# Patient Record
Sex: MALE | Race: WHITE | HISPANIC OR LATINO | URBAN - METROPOLITAN AREA
[De-identification: names, ages, dates, MRNs, and addresses within clinical notes are randomized per-mention and may not be internally consistent; named-entity substitution may affect disease eponyms.]

---

## 2018-05-12 ENCOUNTER — INPATIENT (INPATIENT)
Facility: HOSPITAL | Age: 73
LOS: 1 days | Discharge: HOME | DRG: 149 | End: 2018-05-14
Attending: INTERNAL MEDICINE | Admitting: INTERNAL MEDICINE
Payer: MEDICARE

## 2018-05-12 VITALS
TEMPERATURE: 97 F | SYSTOLIC BLOOD PRESSURE: 208 MMHG | HEART RATE: 67 BPM | RESPIRATION RATE: 18 BRPM | OXYGEN SATURATION: 99 % | WEIGHT: 143.96 LBS | HEIGHT: 65 IN | DIASTOLIC BLOOD PRESSURE: 81 MMHG

## 2018-05-12 DIAGNOSIS — R74.8 ABNORMAL LEVELS OF OTHER SERUM ENZYMES: ICD-10-CM

## 2018-05-12 DIAGNOSIS — I10 ESSENTIAL (PRIMARY) HYPERTENSION: ICD-10-CM

## 2018-05-12 DIAGNOSIS — I21.4 NON-ST ELEVATION (NSTEMI) MYOCARDIAL INFARCTION: ICD-10-CM

## 2018-05-12 DIAGNOSIS — E11.9 TYPE 2 DIABETES MELLITUS WITHOUT COMPLICATIONS: ICD-10-CM

## 2018-05-12 DIAGNOSIS — E78.00 PURE HYPERCHOLESTEROLEMIA, UNSPECIFIED: ICD-10-CM

## 2018-05-12 DIAGNOSIS — R42 DIZZINESS AND GIDDINESS: ICD-10-CM

## 2018-05-12 DIAGNOSIS — Z29.9 ENCOUNTER FOR PROPHYLACTIC MEASURES, UNSPECIFIED: ICD-10-CM

## 2018-05-12 LAB
ALBUMIN SERPL ELPH-MCNC: 3.4 G/DL — LOW (ref 3.5–5)
ALP SERPL-CCNC: 71 U/L — SIGNIFICANT CHANGE UP (ref 40–120)
ALT FLD-CCNC: 16 U/L DA — SIGNIFICANT CHANGE UP (ref 10–60)
ANION GAP SERPL CALC-SCNC: 6 MMOL/L — SIGNIFICANT CHANGE UP (ref 5–17)
ANION GAP SERPL CALC-SCNC: 7 MMOL/L — SIGNIFICANT CHANGE UP (ref 5–17)
APTT BLD: 27.1 SEC — LOW (ref 27.5–37.4)
AST SERPL-CCNC: 17 U/L — SIGNIFICANT CHANGE UP (ref 10–40)
BILIRUB SERPL-MCNC: 0.2 MG/DL — SIGNIFICANT CHANGE UP (ref 0.2–1.2)
BUN SERPL-MCNC: 15 MG/DL — SIGNIFICANT CHANGE UP (ref 7–18)
BUN SERPL-MCNC: 16 MG/DL — SIGNIFICANT CHANGE UP (ref 7–18)
CALCIUM SERPL-MCNC: 8.6 MG/DL — SIGNIFICANT CHANGE UP (ref 8.4–10.5)
CALCIUM SERPL-MCNC: 8.7 MG/DL — SIGNIFICANT CHANGE UP (ref 8.4–10.5)
CHLORIDE SERPL-SCNC: 106 MMOL/L — SIGNIFICANT CHANGE UP (ref 96–108)
CHLORIDE SERPL-SCNC: 107 MMOL/L — SIGNIFICANT CHANGE UP (ref 96–108)
CHOLEST SERPL-MCNC: 131 MG/DL — SIGNIFICANT CHANGE UP (ref 10–199)
CK MB BLD-MCNC: 1.9 % — SIGNIFICANT CHANGE UP (ref 0–3.5)
CK MB CFR SERPL CALC: 2.5 NG/ML — SIGNIFICANT CHANGE UP (ref 0–3.6)
CK SERPL-CCNC: 129 U/L — SIGNIFICANT CHANGE UP (ref 35–232)
CO2 SERPL-SCNC: 22 MMOL/L — SIGNIFICANT CHANGE UP (ref 22–31)
CO2 SERPL-SCNC: 23 MMOL/L — SIGNIFICANT CHANGE UP (ref 22–31)
CREAT SERPL-MCNC: 1.15 MG/DL — SIGNIFICANT CHANGE UP (ref 0.5–1.3)
CREAT SERPL-MCNC: 1.22 MG/DL — SIGNIFICANT CHANGE UP (ref 0.5–1.3)
EOSINOPHIL NFR BLD AUTO: 1 % — SIGNIFICANT CHANGE UP (ref 0–6)
GLUCOSE BLDC GLUCOMTR-MCNC: 124 MG/DL — HIGH (ref 70–99)
GLUCOSE BLDC GLUCOMTR-MCNC: 151 MG/DL — HIGH (ref 70–99)
GLUCOSE BLDC GLUCOMTR-MCNC: 154 MG/DL — HIGH (ref 70–99)
GLUCOSE BLDC GLUCOMTR-MCNC: 216 MG/DL — HIGH (ref 70–99)
GLUCOSE BLDC GLUCOMTR-MCNC: 222 MG/DL — HIGH (ref 70–99)
GLUCOSE SERPL-MCNC: 190 MG/DL — HIGH (ref 70–99)
GLUCOSE SERPL-MCNC: 194 MG/DL — HIGH (ref 70–99)
HCT VFR BLD CALC: 36.7 % — LOW (ref 39–50)
HCT VFR BLD CALC: 36.8 % — LOW (ref 39–50)
HDLC SERPL-MCNC: 32 MG/DL — LOW (ref 40–125)
HGB BLD-MCNC: 11.6 G/DL — LOW (ref 13–17)
HGB BLD-MCNC: 11.8 G/DL — LOW (ref 13–17)
INR BLD: 0.97 RATIO — SIGNIFICANT CHANGE UP (ref 0.88–1.16)
LACTATE SERPL-SCNC: 1.2 MMOL/L — SIGNIFICANT CHANGE UP (ref 0.7–2)
LIDOCAIN IGE QN: 163 U/L — SIGNIFICANT CHANGE UP (ref 73–393)
LIPID PNL WITH DIRECT LDL SERPL: 71 MG/DL — SIGNIFICANT CHANGE UP
LYMPHOCYTES # BLD AUTO: 31 % — SIGNIFICANT CHANGE UP (ref 13–44)
MAGNESIUM SERPL-MCNC: 2.2 MG/DL — SIGNIFICANT CHANGE UP (ref 1.6–2.6)
MCHC RBC-ENTMCNC: 26.3 PG — LOW (ref 27–34)
MCHC RBC-ENTMCNC: 26.7 PG — LOW (ref 27–34)
MCHC RBC-ENTMCNC: 31.5 GM/DL — LOW (ref 32–36)
MCHC RBC-ENTMCNC: 32 GM/DL — SIGNIFICANT CHANGE UP (ref 32–36)
MCV RBC AUTO: 83.4 FL — SIGNIFICANT CHANGE UP (ref 80–100)
MCV RBC AUTO: 83.5 FL — SIGNIFICANT CHANGE UP (ref 80–100)
MONOCYTES NFR BLD AUTO: 5 % — SIGNIFICANT CHANGE UP (ref 2–14)
NEUTROPHILS NFR BLD AUTO: 63 % — SIGNIFICANT CHANGE UP (ref 43–77)
PHOSPHATE SERPL-MCNC: 2.7 MG/DL — SIGNIFICANT CHANGE UP (ref 2.5–4.5)
PLATELET # BLD AUTO: 202 K/UL — SIGNIFICANT CHANGE UP (ref 150–400)
PLATELET # BLD AUTO: 231 K/UL — SIGNIFICANT CHANGE UP (ref 150–400)
POTASSIUM SERPL-MCNC: 3.6 MMOL/L — SIGNIFICANT CHANGE UP (ref 3.5–5.3)
POTASSIUM SERPL-MCNC: 4.6 MMOL/L — SIGNIFICANT CHANGE UP (ref 3.5–5.3)
POTASSIUM SERPL-SCNC: 3.6 MMOL/L — SIGNIFICANT CHANGE UP (ref 3.5–5.3)
POTASSIUM SERPL-SCNC: 4.6 MMOL/L — SIGNIFICANT CHANGE UP (ref 3.5–5.3)
PROT SERPL-MCNC: 8 G/DL — SIGNIFICANT CHANGE UP (ref 6–8.3)
PROTHROM AB SERPL-ACNC: 10.6 SEC — SIGNIFICANT CHANGE UP (ref 9.8–12.7)
RBC # BLD: 4.39 M/UL — SIGNIFICANT CHANGE UP (ref 4.2–5.8)
RBC # BLD: 4.41 M/UL — SIGNIFICANT CHANGE UP (ref 4.2–5.8)
RBC # FLD: 12.8 % — SIGNIFICANT CHANGE UP (ref 10.3–14.5)
RBC # FLD: 13.2 % — SIGNIFICANT CHANGE UP (ref 10.3–14.5)
SODIUM SERPL-SCNC: 135 MMOL/L — SIGNIFICANT CHANGE UP (ref 135–145)
SODIUM SERPL-SCNC: 136 MMOL/L — SIGNIFICANT CHANGE UP (ref 135–145)
TOTAL CHOLESTEROL/HDL RATIO MEASUREMENT: 4.1 RATIO — SIGNIFICANT CHANGE UP (ref 3.4–9.6)
TRIGL SERPL-MCNC: 139 MG/DL — SIGNIFICANT CHANGE UP (ref 10–149)
TROPONIN I SERPL-MCNC: 0.24 NG/ML — HIGH (ref 0–0.04)
TROPONIN I SERPL-MCNC: 0.25 NG/ML — HIGH (ref 0–0.04)
TROPONIN I SERPL-MCNC: 0.26 NG/ML — HIGH (ref 0–0.04)
TSH SERPL-MCNC: 0.78 UU/ML — SIGNIFICANT CHANGE UP (ref 0.34–4.82)
WBC # BLD: 4.6 K/UL — SIGNIFICANT CHANGE UP (ref 3.8–10.5)
WBC # BLD: 4.8 K/UL — SIGNIFICANT CHANGE UP (ref 3.8–10.5)
WBC # FLD AUTO: 4.6 K/UL — SIGNIFICANT CHANGE UP (ref 3.8–10.5)
WBC # FLD AUTO: 4.8 K/UL — SIGNIFICANT CHANGE UP (ref 3.8–10.5)

## 2018-05-12 PROCEDURE — 93010 ELECTROCARDIOGRAM REPORT: CPT | Mod: 76

## 2018-05-12 PROCEDURE — 70450 CT HEAD/BRAIN W/O DYE: CPT | Mod: 26

## 2018-05-12 PROCEDURE — 99285 EMERGENCY DEPT VISIT HI MDM: CPT | Mod: 25

## 2018-05-12 RX ORDER — ASPIRIN/CALCIUM CARB/MAGNESIUM 324 MG
81 TABLET ORAL DAILY
Qty: 0 | Refills: 0 | Status: DISCONTINUED | OUTPATIENT
Start: 2018-05-12 | End: 2018-05-14

## 2018-05-12 RX ORDER — ENOXAPARIN SODIUM 100 MG/ML
70 INJECTION SUBCUTANEOUS ONCE
Qty: 0 | Refills: 0 | Status: COMPLETED | OUTPATIENT
Start: 2018-05-12 | End: 2018-05-12

## 2018-05-12 RX ORDER — LISINOPRIL 2.5 MG/1
1 TABLET ORAL
Qty: 0 | Refills: 0 | COMMUNITY

## 2018-05-12 RX ORDER — LABETALOL HCL 100 MG
100 TABLET ORAL
Qty: 0 | Refills: 0 | Status: DISCONTINUED | OUTPATIENT
Start: 2018-05-12 | End: 2018-05-12

## 2018-05-12 RX ORDER — SODIUM CHLORIDE 9 MG/ML
3 INJECTION INTRAMUSCULAR; INTRAVENOUS; SUBCUTANEOUS ONCE
Qty: 0 | Refills: 0 | Status: COMPLETED | OUTPATIENT
Start: 2018-05-12 | End: 2018-05-12

## 2018-05-12 RX ORDER — INSULIN LISPRO 100/ML
VIAL (ML) SUBCUTANEOUS
Qty: 0 | Refills: 0 | Status: DISCONTINUED | OUTPATIENT
Start: 2018-05-12 | End: 2018-05-14

## 2018-05-12 RX ORDER — INSULIN GLARGINE 100 [IU]/ML
35 INJECTION, SOLUTION SUBCUTANEOUS AT BEDTIME
Qty: 0 | Refills: 0 | Status: DISCONTINUED | OUTPATIENT
Start: 2018-05-12 | End: 2018-05-14

## 2018-05-12 RX ORDER — ATORVASTATIN CALCIUM 80 MG/1
40 TABLET, FILM COATED ORAL AT BEDTIME
Qty: 0 | Refills: 0 | Status: DISCONTINUED | OUTPATIENT
Start: 2018-05-12 | End: 2018-05-14

## 2018-05-12 RX ORDER — ASPIRIN/CALCIUM CARB/MAGNESIUM 324 MG
325 TABLET ORAL ONCE
Qty: 0 | Refills: 0 | Status: DISCONTINUED | OUTPATIENT
Start: 2018-05-12 | End: 2018-05-12

## 2018-05-12 RX ADMIN — Medication 1.25 MILLIGRAM(S): at 11:01

## 2018-05-12 RX ADMIN — Medication 4: at 12:45

## 2018-05-12 RX ADMIN — INSULIN GLARGINE 35 UNIT(S): 100 INJECTION, SOLUTION SUBCUTANEOUS at 22:24

## 2018-05-12 RX ADMIN — ATORVASTATIN CALCIUM 40 MILLIGRAM(S): 80 TABLET, FILM COATED ORAL at 05:57

## 2018-05-12 RX ADMIN — ATORVASTATIN CALCIUM 40 MILLIGRAM(S): 80 TABLET, FILM COATED ORAL at 22:24

## 2018-05-12 RX ADMIN — SODIUM CHLORIDE 3 MILLILITER(S): 9 INJECTION INTRAMUSCULAR; INTRAVENOUS; SUBCUTANEOUS at 02:13

## 2018-05-12 RX ADMIN — Medication 4: at 17:52

## 2018-05-12 RX ADMIN — Medication 81 MILLIGRAM(S): at 13:37

## 2018-05-12 RX ADMIN — Medication 2: at 22:23

## 2018-05-12 RX ADMIN — ENOXAPARIN SODIUM 70 MILLIGRAM(S): 100 INJECTION SUBCUTANEOUS at 05:57

## 2018-05-12 NOTE — ED PROVIDER NOTE - PROGRESS NOTE DETAILS
repeat EKG w no changes in ST morphology compared to prior. Pt still asymptomatic. D/w MAR/Dr Patten, will admit for NSTEMI. Will repeat troponin

## 2018-05-12 NOTE — H&P ADULT - PROBLEM SELECTOR PLAN 4
on Lantus 40 and Humalog 10 ass needed  started on Lantus 35  HSS  f/u A1c pt is on acetazolamide, amlodipine, HCTZ,  and labetalol   restart slowly after permissive HTN ends

## 2018-05-12 NOTE — ED PROVIDER NOTE - CHPI ED SYMPTOMS POS
lightheadedness, abdominal distention/SHORTNESS OF BREATH/DIZZINESS lightheadedness, abdominal distention, tongue and head numbness/SHORTNESS OF BREATH/DIZZINESS

## 2018-05-12 NOTE — ED PROVIDER NOTE - MEDICAL DECISION MAKING DETAILS
73 y/o M pt with possible ACS but asymptomatic upon exam. EKG shows no ST elevation but possible hyperacute Ts in anterior leads V1-V3. No prior EKG to compare with and no reciprocal changes. Will give aspirin, Lovenox and admit for NSTEMI.

## 2018-05-12 NOTE — ED ADULT TRIAGE NOTE - CHIEF COMPLAINT QUOTE
ambulatory with c/o dizziness and stomach pain,no vomitting,while in triage he c/o numbness to the tongue since 5 pm today and also c/o headache

## 2018-05-12 NOTE — H&P ADULT - PROBLEM SELECTOR PLAN 1
was found to have troponin 0.256 with no chest pain   EKG: NSR @ 69 with questionable hyper acute T waves  Telemetry   ASA, statin   echo   Cardio: was found to have troponin 0.256 with no chest pain   EKG: NSR @ 69 with questionable hyper acute T waves  Telemetry   ASA, statin   echo   Cardio: Dr. Whitley

## 2018-05-12 NOTE — H&P ADULT - PROBLEM SELECTOR PLAN 2
accompanied with tongue and face  numbness   CT head negative   symptoms resolved while in ED  CD accompanied with tongue and face  numbness   CT head negative   symptoms resolved while in ED  orthostatic negative   Pt was at [facility] for dizziness but family does not know final Dx  CD accompanied with tongue and face  numbness   CT head negative   symptoms resolved while in ED  orthostatic negative   Pt was at [facility] for dizziness but family does not know final Dx  CD  Neuro: Dr Woody accompanied with tongue and face  numbness   CT head negative   symptoms resolved while in ED  orthostatic negative   Pt was at [facility] for dizziness but family does not know final Dx  Pt takes multiple BP medications, restart slowly after permissive HTN ends   CD  Neuro: Dr Woody accompanied with tongue and face  numbness   CT head negative   symptoms resolved while in ED  orthostatic negative   Pt was at [facility] for dizziness but family does not know final Dx  Pt takes multiple BP medications, restart slowly after permissive HTN ends   CD  Neuro: Dr Catalan accompanied with tongue and face  numbness   CT head negative   symptoms resolved while in ED  orthostatic negative   TIA?  Pt was at [facility] for dizziness but family does not know final Dx  Pt takes multiple BP medications, restart slowly after permissive HTN ends   CD  Neuro: Dr Catalan

## 2018-05-12 NOTE — H&P ADULT - ASSESSMENT
71 y/o M pt with PMHx of DM, HLD, CVA 2 years ago  presents to the ED  c/o dizziness, lightheadedness and face and tongue numbness. Pt states that symptoms started today at 5 pm accompanied with headache and SOB. At ED pt was found to have elevated troponin 0.256 and EKG was NSR @ 69 with concerns of hyper acute t waves. Pt denies CP, N/V, diarrhea or any other complaints. Admitted for ACS work up

## 2018-05-12 NOTE — H&P ADULT - HISTORY OF PRESENT ILLNESS
71 y/o M pt with PMHx of DM, HLD, CVA 2 years ago  presents to the ED  c/o dizziness, lightheadedness and face and tongue numbness. Pt states that symptoms started today at 5 pm accompanied with headache and SOB. As per wife SOB has been going on for a while now. Wife recommend to come to hospital but he decided to go to his daughter house. Symptoms persisted and then he came to hospital. Family states that pt was at [facility] due to dizziness but does not know the what the final diagnosis was. At ED pt was found to have elevated troponin 0.256 and EKG was NSR @ 69 with concerns of hyper acute t waves. Pt denies CP, N/V, diarrhea or any other complaints.

## 2018-05-12 NOTE — H&P ADULT - NSHPPHYSICALEXAM_GEN_ALL_CORE
PHYSICAL EXAM:  GENERAL: NAD, speaks in full sentences, no signs of respiratory distress  HEENT:  Atraumatic, Normocephalic,  EOMI, PERRLA, conjunctiva and sclera clear  NECK: Supple, No JVD  LUNG: Clear to auscultation bilaterally; No wheeze; No crackles; No accessory muscles used  CVS: Regular rate and rhythm, no RMG  ABDOMEN: Soft, Nontender, Nondistended; Bowel sounds present; No guarding  : no suprapubic pain   EXTREMITIES:  2+ Peripheral Pulses, No cyanosis or edema  SKIN: No rashes or lesions  Neuro: AAOx3, non-focal

## 2018-05-12 NOTE — ED PROVIDER NOTE - OBJECTIVE STATEMENT
73 y/o M pt with PMHx of DM, high cholesterol, MI and no significant PSHx presents to the ED with daughter as  c/o dizziness, lightheadedness, abdominal distention for 3x days and shortness of breath with an onset of today. Pt describes his lightheadedness as feeling like passing out every 15 minutes; notes that his tongue and head feels numb. Pt denies headache, chest pain or any other complaints. NKDA. 71 y/o M pt with PMHx of DM, high cholesterol, MI and no significant PSHx presents to the ED with daughter as  c/o dizziness, lightheadedness, abdominal distention for 3x days and shortness of breath with an onset of today. Pt describes his lightheadedness as feeling like passing out every 15 minutes; notes that his tongue and head feels numb. Pt denies headache, chest pain or any other complaints. NKDA. PMD: Dr. Giovany Green

## 2018-05-12 NOTE — H&P ADULT - PROBLEM SELECTOR PLAN 5
IMPROVE VTE Individual Risk Assessment    RISK                                                          Points  [] Previous VTE                                           3  [] Thrombophilia                                        2  [] Lower limb paralysis                              2   [] Current Cancer                                       2   [x] Immobilization > 24 hrs                        1  [] ICU/CCU stay > 24 hours                       1  [x] Age > 60                                                   1    IMPROVE VTE Score: 2 on Lantus 40 and Humalog 10 ass needed  started on Lantus 35  HSS  f/u A1c

## 2018-05-13 DIAGNOSIS — I63.9 CEREBRAL INFARCTION, UNSPECIFIED: ICD-10-CM

## 2018-05-13 DIAGNOSIS — R51 HEADACHE: ICD-10-CM

## 2018-05-13 LAB
CRP SERPL-MCNC: <0.2 MG/DL — SIGNIFICANT CHANGE UP (ref 0–0.4)
ERYTHROCYTE [SEDIMENTATION RATE] IN BLOOD: 34 MM/HR — HIGH (ref 0–20)
FOLATE SERPL-MCNC: 11.8 NG/ML — SIGNIFICANT CHANGE UP
GLUCOSE BLDC GLUCOMTR-MCNC: 192 MG/DL — HIGH (ref 70–99)
GLUCOSE BLDC GLUCOMTR-MCNC: 223 MG/DL — HIGH (ref 70–99)
GLUCOSE BLDC GLUCOMTR-MCNC: 226 MG/DL — HIGH (ref 70–99)
GLUCOSE BLDC GLUCOMTR-MCNC: 242 MG/DL — HIGH (ref 70–99)
GLUCOSE BLDC GLUCOMTR-MCNC: 78 MG/DL — SIGNIFICANT CHANGE UP (ref 70–99)
HBA1C BLD-MCNC: 8.8 % — HIGH (ref 4–5.6)
VIT B12 SERPL-MCNC: 680 PG/ML — SIGNIFICANT CHANGE UP (ref 232–1245)

## 2018-05-13 PROCEDURE — 99223 1ST HOSP IP/OBS HIGH 75: CPT

## 2018-05-13 RX ORDER — LABETALOL HCL 100 MG
100 TABLET ORAL
Qty: 0 | Refills: 0 | Status: DISCONTINUED | OUTPATIENT
Start: 2018-05-13 | End: 2018-05-14

## 2018-05-13 RX ADMIN — ATORVASTATIN CALCIUM 40 MILLIGRAM(S): 80 TABLET, FILM COATED ORAL at 21:34

## 2018-05-13 RX ADMIN — Medication 100 MILLIGRAM(S): at 17:56

## 2018-05-13 RX ADMIN — Medication 4: at 21:35

## 2018-05-13 RX ADMIN — Medication 4: at 17:56

## 2018-05-13 RX ADMIN — Medication 4: at 12:32

## 2018-05-13 RX ADMIN — Medication 1 DROP(S): at 21:35

## 2018-05-13 RX ADMIN — Medication 81 MILLIGRAM(S): at 12:32

## 2018-05-13 RX ADMIN — INSULIN GLARGINE 35 UNIT(S): 100 INJECTION, SOLUTION SUBCUTANEOUS at 21:35

## 2018-05-13 NOTE — CONSULT NOTE ADULT - ASSESSMENT
1) Right side headaches, dizziness- ? R/o Temporal arteritis, new stroke. Please order Sed rate, CRP. MRI-brain with and without, MRA-head without and MRA-neck with and without SHELBY. Continue aspirin for now.

## 2018-05-13 NOTE — CONSULT NOTE ADULT - SUBJECTIVE AND OBJECTIVE BOX
History of Present Illness:    HPI:  71 y/o M pt with PMHx of DM, HLD, CVA 2 years ago  presents to the ED  c/o dizziness, lightheadedness and face and tongue numbness. Pt states that symptoms started today at 5 pm accompanied with headache and SOB. As per wife SOB has been going on for a while now. Wife recommend to come to hospital but he decided to go to his daughter house. Symptoms persisted and then he came to hospital. Family states that pt was at [facility] due to dizziness but does not know the what the final diagnosis was. At ED pt was found to have elevated troponin 0.256 and EKG was NSR @ 69 with concerns of hyper acute t waves. Pt denies CP, N/V, diarrhea or any other complaints. (12 May 2018 05:32)    Later on the floor: Pt is c/o right sided headaches and blurred vision for last 2-3 days. He also felt dizziness. 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 No nausea or vomiting. His CT-scan of the brain shows old left PCA territory stroke.    Allergies    No Known Allergies    Intolerances      PAST MEDICAL & SURGICAL HISTORY:  High cholesterol  DM (diabetes mellitus)  No significant past surgical history    Social History: [ ] Tobacco use, [ ] Alcohol use.  none    MEDICATIONS  (STANDING):  aspirin enteric coated 81 milliGRAM(s) Oral daily  atorvastatin 40 milliGRAM(s) Oral at bedtime  insulin glargine Injectable (LANTUS) 35 Unit(s) SubCutaneous at bedtime  insulin lispro (HumaLOG) corrective regimen sliding scale   SubCutaneous Before meals and at bedtime  labetalol 100 milliGRAM(s) Oral two times a day    Family:  FAMILY HISTORY:  No pertinent family history in first degree relatives    Review of Systems:  General: [ ] None, [ ] chills,[ ] fatigue,[ ] fevers  Skin: [ ] None, [ ] rash present  HEENT: [ ] None, [ ] head injury,[x ] blurred vision, [ ] double vision, [ ] eye pain, [ ] visual loss, [ ] hearing loss, [ ] deafness, [ ] ear pain, [ ] ringing in the ears, [ ] vertigo, [ ] sinus pain, [ ] voice changes  Neck: [ ] None, [ ] Neck stiffness  Respiratory: [ ]  None, [ ] cough, [ ] difficulty breathing  Cardiovascular: [ ] None,  [ ] calf cramps, [ ] chest pain, [ ] leg pain, [ ] swelling, [ ] rapid heart rate, [ ] shortness of breath  Gastrointestinal: [ ] None, [ ] abdominal pain, [ ] nausea, [ ] vomiting  Musculoskeletal: [ ] None, [ ] back pain, [ ] joint pain, [ ] joint stiffness, [ ] leg cramps, [ ] muscle atrophy, [ ] muscle cramps, [ ] muscle weakness, [ ] swelling of extremities  Neurological: [ ] None,  [ ] Dizziness, [ ] decreased memory, [ ] fainting, [ ] focal neurological symptoms, [x ] headaches, [ ] incontinence of stool, [ ] incontinence of urine, [ ] loss of consciousness, [ ] numbness, [ ] seizures, [ ] spinning sensation, [ ] stroke, [ ] trouble walking, [ ] unsteadiness, [ ] visual changes,  [x ] weakness- left side, [ ] tremors, [ ] rigidity, [ ] slowness  Psychiatric: [ ] None,  [ ] depression, [ ] anxiety, [ ] hallucinations, [ ] inability to concentrate,[ ] mood changes, [ ] panic attacks  Hematology: [ ] None, [ ] blood clots, [ ] spontaneous bleeding    [x ]  None except marked above      Vital Signs:    T(C): 36.2 (05-13-18 @ 07:33), Max: 37.1 (05-12-18 @ 19:29)  HR: 77 (05-13-18 @ 07:33) (63 - 78)  BP: 162/69 (05-13-18 @ 07:33) (162/69 - 208/81)  RR: 18 (05-13-18 @ 07:33) (18 - 18)  SpO2: 100% (05-13-18 @ 07:33) (98% - 100%)    Physical Exam:  General:  General Appearance - Well groomed, Not sickly   Build and nutrition - Well nourished and Well developed  Posture - Normal posture    Head and Neck:  Head - normocephalic, atraumatic with no lesions or palpable masses    Chest and Lung exam:  Quiet, even and easy respiratory effort with no use of accessory muscles and on ausculation, normal breath sounds, no adventitious sounds and normal vocal resonance    Cardiovascular:  Auscultation: Normal heart sounds  Murmurs & Other heart sounds: Auscultation of the heart reveals- no murmurs  Carotid arteris: No Carotid bruits    Abdomen:  Palpation/Percussion: Non Tender, No Rebound tenderness, No hepatosplenomegaly, and No palpable abdominal masses    Peripheral Vascular:  Lower extremity: Inspection - Bilateral - No varicose veins  Palpation: Tenderness - bilateral - Non tender  Dorsalis pedis pulse - bilateral - normal  Edema - bilateral - no edema    Neurologic Exam:  Mental Status -  Alert, Awake  Fund of Knowledge – Normal  Affect- appropriate  Recent Memory – Normal  Remote Memory – Normal  Attention Span – Normal  Concentration –  Normal  Cognitive function – Normal  Speech – Normal  Thought content/perception – Normal    Cranial Nerves:  II Optic: Visual acuity – Bilateral - Normal ; Visual fields – normal ; Fundi – Bilateral – no optic atrophy or Papilledema  III Oculomotor – Normal bilaterally  IV Trochlear – Bilateral – Normal  V Trigeminal: Ophthalmic – Bilateral – Normal;  Maxillary – Bilateral- Normal; Mandibular – Bilateral - Normal  VI Abducens – Bilateral - Normal  VII Facial: mild left facial droop  VIII Acoustic - Bilateral – hearing normal and (hearing tested by finger rub)  IX Glossopharyngeal / X Vagus: Uvula – Normal  XI Accessory: Normal Shoulder Shrug  XII Hypoglossal – Bilaterally Normal  Eye Movements: Gaze – Bilateral - Normal    Nystagmus – Bilateral – None  Motor:  Bulk and Contour: Normal  Tone: Normal  Strength:                                                             Delt              Bicep           Tricep                                 Upper Extremities:          Right              5/5               5/5               5/5                5/5                                                          Left                4/5               4/5               4/5                5-/5                                                                                  HF                 KE                KF                   DF                     Lower Extremities:           Right             5/5               5/5              5/5                  5/5                                                          Left               4/5               4/5              4/5                  5-/5  General Assessment of Reflexes: Right Wrist - 2+ ;  Left Wrist - 2+ ; Right Elbow - 2+ ;  Left Elbow - 2+ ;  Right Knee - 2+ ;  Left Knee - 2+ ;   Right Ankle – 2+ ; Left Ankle – 2+  Plantar Reflexes(Babinski) (L4-S2) – Bilateral – Flexion  Sensory:  Light Touch: Intact – Globally  Pain: Intact – Globally  Temperature: Intact – Globally  Coordination – No Impairment of heel-to-shin, Impairment of finger-to-nose or Impairment of rapid alternating movement  Gait –mild left leg limp

## 2018-05-14 VITALS
TEMPERATURE: 98 F | OXYGEN SATURATION: 99 % | DIASTOLIC BLOOD PRESSURE: 73 MMHG | HEART RATE: 72 BPM | SYSTOLIC BLOOD PRESSURE: 152 MMHG | RESPIRATION RATE: 16 BRPM

## 2018-05-14 LAB
ANION GAP SERPL CALC-SCNC: 9 MMOL/L — SIGNIFICANT CHANGE UP (ref 5–17)
BUN SERPL-MCNC: 27 MG/DL — HIGH (ref 7–18)
CALCIUM SERPL-MCNC: 8.8 MG/DL — SIGNIFICANT CHANGE UP (ref 8.4–10.5)
CHLORIDE SERPL-SCNC: 107 MMOL/L — SIGNIFICANT CHANGE UP (ref 96–108)
CO2 SERPL-SCNC: 25 MMOL/L — SIGNIFICANT CHANGE UP (ref 22–31)
CREAT SERPL-MCNC: 1.2 MG/DL — SIGNIFICANT CHANGE UP (ref 0.5–1.3)
GLUCOSE BLDC GLUCOMTR-MCNC: 115 MG/DL — HIGH (ref 70–99)
GLUCOSE BLDC GLUCOMTR-MCNC: 167 MG/DL — HIGH (ref 70–99)
GLUCOSE SERPL-MCNC: 109 MG/DL — HIGH (ref 70–99)
HCT VFR BLD CALC: 36.3 % — LOW (ref 39–50)
HGB BLD-MCNC: 11.9 G/DL — LOW (ref 13–17)
MCHC RBC-ENTMCNC: 27 PG — SIGNIFICANT CHANGE UP (ref 27–34)
MCHC RBC-ENTMCNC: 32.8 GM/DL — SIGNIFICANT CHANGE UP (ref 32–36)
MCV RBC AUTO: 82.4 FL — SIGNIFICANT CHANGE UP (ref 80–100)
PLATELET # BLD AUTO: 212 K/UL — SIGNIFICANT CHANGE UP (ref 150–400)
POTASSIUM SERPL-MCNC: 3.2 MMOL/L — LOW (ref 3.5–5.3)
POTASSIUM SERPL-SCNC: 3.2 MMOL/L — LOW (ref 3.5–5.3)
RBC # BLD: 4.4 M/UL — SIGNIFICANT CHANGE UP (ref 4.2–5.8)
RBC # FLD: 12.8 % — SIGNIFICANT CHANGE UP (ref 10.3–14.5)
SODIUM SERPL-SCNC: 141 MMOL/L — SIGNIFICANT CHANGE UP (ref 135–145)
WBC # BLD: 5.3 K/UL — SIGNIFICANT CHANGE UP (ref 3.8–10.5)
WBC # FLD AUTO: 5.3 K/UL — SIGNIFICANT CHANGE UP (ref 3.8–10.5)

## 2018-05-14 PROCEDURE — 82607 VITAMIN B-12: CPT

## 2018-05-14 PROCEDURE — 82962 GLUCOSE BLOOD TEST: CPT

## 2018-05-14 PROCEDURE — 85730 THROMBOPLASTIN TIME PARTIAL: CPT

## 2018-05-14 PROCEDURE — 83036 HEMOGLOBIN GLYCOSYLATED A1C: CPT

## 2018-05-14 PROCEDURE — 93005 ELECTROCARDIOGRAM TRACING: CPT

## 2018-05-14 PROCEDURE — 70548 MR ANGIOGRAPHY NECK W/DYE: CPT

## 2018-05-14 PROCEDURE — 84443 ASSAY THYROID STIM HORMONE: CPT

## 2018-05-14 PROCEDURE — 80053 COMPREHEN METABOLIC PANEL: CPT

## 2018-05-14 PROCEDURE — 70544 MR ANGIOGRAPHY HEAD W/O DYE: CPT | Mod: 26,59

## 2018-05-14 PROCEDURE — 82550 ASSAY OF CK (CPK): CPT

## 2018-05-14 PROCEDURE — 70551 MRI BRAIN STEM W/O DYE: CPT | Mod: 26

## 2018-05-14 PROCEDURE — 83735 ASSAY OF MAGNESIUM: CPT

## 2018-05-14 PROCEDURE — 82553 CREATINE MB FRACTION: CPT

## 2018-05-14 PROCEDURE — 93306 TTE W/DOPPLER COMPLETE: CPT | Mod: 26

## 2018-05-14 PROCEDURE — 82746 ASSAY OF FOLIC ACID SERUM: CPT

## 2018-05-14 PROCEDURE — 70544 MR ANGIOGRAPHY HEAD W/O DYE: CPT

## 2018-05-14 PROCEDURE — 99285 EMERGENCY DEPT VISIT HI MDM: CPT | Mod: 25

## 2018-05-14 PROCEDURE — 85027 COMPLETE CBC AUTOMATED: CPT

## 2018-05-14 PROCEDURE — 83605 ASSAY OF LACTIC ACID: CPT

## 2018-05-14 PROCEDURE — 85652 RBC SED RATE AUTOMATED: CPT

## 2018-05-14 PROCEDURE — 70548 MR ANGIOGRAPHY NECK W/DYE: CPT | Mod: 26

## 2018-05-14 PROCEDURE — 84100 ASSAY OF PHOSPHORUS: CPT

## 2018-05-14 PROCEDURE — 85610 PROTHROMBIN TIME: CPT

## 2018-05-14 PROCEDURE — 80048 BASIC METABOLIC PNL TOTAL CA: CPT

## 2018-05-14 PROCEDURE — 83690 ASSAY OF LIPASE: CPT

## 2018-05-14 PROCEDURE — 70551 MRI BRAIN STEM W/O DYE: CPT

## 2018-05-14 PROCEDURE — 84484 ASSAY OF TROPONIN QUANT: CPT

## 2018-05-14 PROCEDURE — 70450 CT HEAD/BRAIN W/O DYE: CPT

## 2018-05-14 PROCEDURE — 93306 TTE W/DOPPLER COMPLETE: CPT

## 2018-05-14 PROCEDURE — 80061 LIPID PANEL: CPT

## 2018-05-14 PROCEDURE — 86140 C-REACTIVE PROTEIN: CPT

## 2018-05-14 RX ORDER — ASPIRIN/CALCIUM CARB/MAGNESIUM 324 MG
1 TABLET ORAL
Qty: 30 | Refills: 0 | OUTPATIENT
Start: 2018-05-14 | End: 2018-06-12

## 2018-05-14 RX ORDER — ATORVASTATIN CALCIUM 80 MG/1
1 TABLET, FILM COATED ORAL
Qty: 30 | Refills: 0 | OUTPATIENT
Start: 2018-05-14 | End: 2018-06-12

## 2018-05-14 RX ORDER — AMLODIPINE BESYLATE 2.5 MG/1
1 TABLET ORAL
Qty: 30 | Refills: 0 | OUTPATIENT
Start: 2018-05-14 | End: 2018-06-12

## 2018-05-14 RX ORDER — SITAGLIPTIN 50 MG/1
1 TABLET, FILM COATED ORAL
Qty: 30 | Refills: 0 | OUTPATIENT
Start: 2018-05-14 | End: 2018-06-12

## 2018-05-14 RX ORDER — METFORMIN HYDROCHLORIDE 850 MG/1
1 TABLET ORAL
Qty: 0 | Refills: 0 | COMMUNITY

## 2018-05-14 RX ORDER — ASPIRIN/CALCIUM CARB/MAGNESIUM 324 MG
1 TABLET ORAL
Qty: 0 | Refills: 0 | COMMUNITY

## 2018-05-14 RX ORDER — METFORMIN HYDROCHLORIDE 850 MG/1
1 TABLET ORAL
Qty: 60 | Refills: 0 | OUTPATIENT
Start: 2018-05-14 | End: 2018-06-12

## 2018-05-14 RX ORDER — LISINOPRIL 2.5 MG/1
1 TABLET ORAL
Qty: 30 | Refills: 0 | OUTPATIENT
Start: 2018-05-14 | End: 2018-06-12

## 2018-05-14 RX ORDER — ENOXAPARIN SODIUM 100 MG/ML
40 INJECTION SUBCUTANEOUS
Qty: 1 | Refills: 0 | OUTPATIENT
Start: 2018-05-14 | End: 2018-06-12

## 2018-05-14 RX ORDER — LABETALOL HCL 100 MG
1 TABLET ORAL
Qty: 0 | Refills: 0 | COMMUNITY

## 2018-05-14 RX ORDER — LABETALOL HCL 100 MG
1 TABLET ORAL
Qty: 60 | Refills: 0 | OUTPATIENT
Start: 2018-05-14 | End: 2018-06-12

## 2018-05-14 RX ORDER — LISINOPRIL 2.5 MG/1
1 TABLET ORAL
Qty: 0 | Refills: 0 | COMMUNITY

## 2018-05-14 RX ORDER — AMLODIPINE BESYLATE 2.5 MG/1
1 TABLET ORAL
Qty: 0 | Refills: 0 | COMMUNITY

## 2018-05-14 RX ORDER — ENOXAPARIN SODIUM 100 MG/ML
40 INJECTION SUBCUTANEOUS
Qty: 0 | Refills: 0 | COMMUNITY

## 2018-05-14 RX ORDER — POTASSIUM CHLORIDE 20 MEQ
40 PACKET (EA) ORAL EVERY 4 HOURS
Qty: 0 | Refills: 0 | Status: COMPLETED | OUTPATIENT
Start: 2018-05-14 | End: 2018-05-14

## 2018-05-14 RX ORDER — INSULIN LISPRO 100/ML
10 VIAL (ML) SUBCUTANEOUS
Qty: 0 | Refills: 0 | COMMUNITY

## 2018-05-14 RX ORDER — ACETAZOLAMIDE 250 MG/1
1 TABLET ORAL
Qty: 0 | Refills: 0 | COMMUNITY

## 2018-05-14 RX ORDER — AMLODIPINE BESYLATE 2.5 MG/1
10 TABLET ORAL DAILY
Qty: 0 | Refills: 0 | Status: DISCONTINUED | OUTPATIENT
Start: 2018-05-14 | End: 2018-05-14

## 2018-05-14 RX ORDER — HYDROCHLOROTHIAZIDE 25 MG
12.5 TABLET ORAL DAILY
Qty: 0 | Refills: 0 | Status: DISCONTINUED | OUTPATIENT
Start: 2018-05-14 | End: 2018-05-14

## 2018-05-14 RX ORDER — SITAGLIPTIN 50 MG/1
1 TABLET, FILM COATED ORAL
Qty: 0 | Refills: 0 | COMMUNITY

## 2018-05-14 RX ADMIN — Medication 100 MILLIGRAM(S): at 05:18

## 2018-05-14 RX ADMIN — Medication 100 MILLIGRAM(S): at 17:09

## 2018-05-14 RX ADMIN — Medication 40 MILLIEQUIVALENT(S): at 19:30

## 2018-05-14 RX ADMIN — Medication 40 MILLIEQUIVALENT(S): at 17:08

## 2018-05-14 RX ADMIN — Medication 81 MILLIGRAM(S): at 12:19

## 2018-05-14 RX ADMIN — Medication 2: at 17:08

## 2018-05-14 RX ADMIN — Medication 1 DROP(S): at 12:19

## 2018-05-14 NOTE — PROGRESS NOTE ADULT - ATTENDING COMMENTS
patient is clinically stable   if ok with cardiology and neurology afterTEE and MRI   then d/c later this afternoon
Thank you for the courtesy of the consultation,I would be available for any further discussion if needed.  Norm Whitley MD,FACC.  [address] [place], [place] [id number] [phone]
Thank you for the courtesy of the consultation,I would be available for any further discussion if needed.  Norm Whitley MD,FACC.  [address] [place], [place] [id number] [phone]
CONTINUE CURRENT CARE

## 2018-05-14 NOTE — DISCHARGE NOTE ADULT - HOSPITAL COURSE
71 y/o M pt with PMHx of DM, HLD, CVA 2 years ago  presents to the ED  c/o dizziness, lightheadedness and face and tongue numbness. Pt states that symptoms started today at 5 pm accompanied with headache and SOB. At ED pt was found to have elevated troponin 0.256 and EKG was NSR @ 69 with concerns of hyper acute t waves. Pt denies CP, N/V, diarrhea or any other complaints.     Admitted for ACS work up. Tele monitoring remained uneventful  Elevated trops likely 2/2 demand ischemia from HTN  CT head neg for acute process  MRI neg for acute stroke  MRA neck significant for RT sided ICA stenosis 25-30%  Echo- nml ventricular function w/ EF 55-60%    Neuro: Dr. Catalan  Cardio: Dr. Whitley    Pt's symptoms resolved during stay.  Pt currently medically stable for discharge with instructiion to schedule a close follow up with PCP within 1 week for follow up.

## 2018-05-14 NOTE — DISCHARGE NOTE ADULT - MEDICATION SUMMARY - MEDICATIONS TO STOP TAKING
I will STOP taking the medications listed below when I get home from the hospital:    acetaZOLAMIDE 250 mg oral tablet  -- 1 tab(s) by mouth once a day I will STOP taking the medications listed below when I get home from the hospital:    acetaZOLAMIDE 250 mg oral tablet  -- 1 tab(s) by mouth once a day    HumaLOG KwikPen 100 units/mL injectable solution  -- 10 unit(s) injectable once a day

## 2018-05-14 NOTE — PHYSICAL THERAPY INITIAL EVALUATION ADULT - RANGE OF MOTION EXAMINATION, REHAB EVAL
AA-AROM/Left LE ROM was WFL (within functional limits)/Left UE ROM was WFL (within functional limits)

## 2018-05-14 NOTE — DISCHARGE NOTE ADULT - PLAN OF CARE
Please continue current medication regimen, and schedule a close follow up with your PCP You presented with dizziness and were admitted for possible CVA.  CT head was neg for acute process.  MRI neg for stroke.  Carotid doppler significant for rt sided ICA stenosis of 25-30%.  Echo sig for normal ejection fraction w/ EF 55-60%.  Please continue current medication regimen, and schedule a close follow up with your PCP for follow up. Please continue current antihypertensive regimen You presented with hypertension, and BP meds were held for permissive hypertension.  Please resume BP regimen, and schedule a close follow up with your PCP. HbA1c: 8.8 You diabetes is poorly controlled.  Please continue current insulin regimen and follow up with your PCP as  outpatient within 1 week for follow up. Please continue statin therapy You diabetes is poorly controlled.  Please continue current insulin regimen and follow up with your PCP as  outpatient within 1 week for follow up.  Please hold 10 units pre meal for now and resume as indicated.  Please check glucose before meals.

## 2018-05-14 NOTE — DISCHARGE NOTE ADULT - MEDICATION SUMMARY - MEDICATIONS TO TAKE
I will START or STAY ON the medications listed below when I get home from the hospital:    Aspirin Enteric Coated 81 mg oral delayed release tablet  -- 1 tab(s) by mouth once a day  -- Indication: For Stroke    lisinopril 40 mg oral tablet  -- 1 tab(s) by mouth once a day  -- Indication: For HTN (hypertension)    metFORMIN 1000 mg oral tablet  -- 1 tab(s) by mouth 2 times a day  -- Indication: For DM (diabetes mellitus)    Januvia 50 mg oral tablet  -- 1 tab(s) by mouth once a day  -- Indication: For DM (diabetes mellitus)    Lantus Solostar Pen 100 units/mL subcutaneous solution  -- 40 unit(s) subcutaneous once a day (at bedtime)  -- Indication: For DM (diabetes mellitus)    HumaLOG KwikPen 100 units/mL injectable solution  -- 10 unit(s) injectable once a day  -- Indication: For DM (diabetes mellitus)    atorvastatin 40 mg oral tablet  -- 1 tab(s) by mouth once a day (at bedtime)  -- Indication: For High cholesterol    labetalol 100 mg oral tablet  -- 1 tab(s) by mouth 2 times a day  -- Indication: For HTN (hypertension)    amLODIPine 10 mg oral tablet  -- 1 tab(s) by mouth once a day  -- Indication: For HTN (hypertension)    hydroCHLOROthiazide 12.5 mg oral capsule  -- 1 cap(s) by mouth once a day  -- Indication: For HTN (hypertension) I will START or STAY ON the medications listed below when I get home from the hospital:    Aspirin Enteric Coated 81 mg oral delayed release tablet  -- 1 tab(s) by mouth once a day  -- Indication: For Stroke    lisinopril 40 mg oral tablet  -- 1 tab(s) by mouth once a day  -- Indication: For HTN (hypertension)    Januvia 50 mg oral tablet  -- 1 tab(s) by mouth once a day  -- Indication: For DM (diabetes mellitus)    Lantus Solostar Pen 100 units/mL subcutaneous solution  -- 40 unit(s) subcutaneous once a day (at bedtime)  -- Indication: For DM (diabetes mellitus)    metFORMIN 1000 mg oral tablet  -- 1 tab(s) by mouth 2 times a day  -- Indication: For DM (diabetes mellitus)    atorvastatin 40 mg oral tablet  -- 1 tab(s) by mouth once a day (at bedtime)  -- Indication: For High cholesterol    labetalol 100 mg oral tablet  -- 1 tab(s) by mouth 2 times a day  -- Indication: For HTN (hypertension)    amLODIPine 10 mg oral tablet  -- 1 tab(s) by mouth once a day  -- Indication: For HTN (hypertension)    hydroCHLOROthiazide 12.5 mg oral capsule  -- 1 cap(s) by mouth once a day  -- Indication: For HTN (hypertension) I will START or STAY ON the medications listed below when I get home from the hospital:    Aspirin Enteric Coated 81 mg oral delayed release tablet  -- 1 tab(s) by mouth once a day  -- Indication: For Need for prophylactic measure    lisinopril 40 mg oral tablet  -- 1 tab(s) by mouth once a day  -- Indication: For HTN (hypertension)    Lantus Solostar Pen 100 units/mL subcutaneous solution  -- 40 unit(s) subcutaneous once a day (at bedtime)  -- Indication: For DM (diabetes mellitus)    Januvia 50 mg oral tablet  -- 1 tab(s) by mouth once a day  -- Indication: For DM (diabetes mellitus)    metFORMIN 1000 mg oral tablet  -- 1 tab(s) by mouth 2 times a day  -- Indication: For DM (diabetes mellitus)    atorvastatin 40 mg oral tablet  -- 1 tab(s) by mouth once a day (at bedtime)  -- Indication: For HLD    labetalol 100 mg oral tablet  -- 1 tab(s) by mouth 2 times a day  -- Indication: For HTN (hypertension)    amLODIPine 10 mg oral tablet  -- 1 tab(s) by mouth once a day  -- Indication: For HTN (hypertension)    hydroCHLOROthiazide 12.5 mg oral capsule  -- 1 cap(s) by mouth once a day  -- Indication: For HTN (hypertension)

## 2018-05-14 NOTE — PROGRESS NOTE ADULT - ASSESSMENT
Problem/Plan - 1:  ·  Problem: Elevated troponin.  Plan: was found to have troponin 0.256 with no chest pain   EKG: NSR @ 69 with questionable hyper acute T waves  Telemetry   ASA, statin   echo   Cardio: Dr. Whitley.     Problem/Plan - 2:  ·  Problem: Dizziness.  Plan: accompanied with tongue and face  numbness   CT head negative   symptoms resolved while in ED  orthostatic negative   TIA?  Pt was at [facility] for dizziness but family does not know final Dx  Pt takes multiple BP medications, restart slowly after permissive HTN ends   CD  Neuro: Dr Catalan.     Problem/Plan - 3:  ·  Problem: High cholesterol.  Plan: on statins.     Problem/Plan - 4:  ·  Problem: HTN (hypertension).  Plan: pt is on acetazolamide, amlodipine, HCTZ,  and labetalol   restart slowly after permissive HTN ends.     Problem/Plan - 5:  ·  Problem: DM (diabetes mellitus).  Plan: on Lantus 40 and Humalog 10 ass needed  started on Lantus 35  HSS
Problem/Plan - 1:  ·  Problem: Elevated troponin.  Plan: was found to have troponin 0.256 with no chest pain   EKG: NSR @ 69 with questionable hyper acute T waves  Telemetry   ASA, statin   echo   Cardio: Dr. Whitley.     Problem/Plan - 2:  ·  Problem: Dizziness.  Plan: accompanied with tongue and face  numbness   CT head negative   symptoms resolved while in ED  orthostatic negative   TIA?  Pt was at [facility] for dizziness but family does not know final Dx  Pt takes multiple BP medications, restart slowly after permissive HTN ends   CD  Neuro: Dr Catalan.     Problem/Plan - 3:  ·  Problem: High cholesterol.  Plan: on statins.     Problem/Plan - 4:  ·  Problem: HTN (hypertension).  Plan: pt is on acetazolamide, amlodipine, HCTZ,  and labetalol   restart slowly after permissive HTN ends.     Problem/Plan - 5:  ·  Problem: DM (diabetes mellitus).  Plan: on Lantus 40 and Humalog 10 ass needed  started on Lantus 35  HSS  f/u A1c.     Problem/Plan - 6:  Problem: Need for prophylactic measure. Plan: IMPROVE VTE Individual Risk Assessment    RISK                                                          Points  [] Previous VTE                                           3  [] Thrombophilia                                        2  [] Lower limb paralysis                              2   [] Current Cancer                                       2

## 2018-05-14 NOTE — DISCHARGE NOTE ADULT - CARE PLAN
Principal Discharge DX:	Dizziness  Goal:	Please continue current medication regimen, and schedule a close follow up with your PCP  Assessment and plan of treatment:	You presented with dizziness and were admitted for possible CVA.  CT head was neg for acute process.  MRI neg for stroke.  Carotid doppler significant for rt sided ICA stenosis of 25-30%.  Echo sig for normal ejection fraction w/ EF 55-60%.  Please continue current medication regimen, and schedule a close follow up with your PCP for follow up.  Secondary Diagnosis:	HTN (hypertension)  Goal:	Please continue current antihypertensive regimen  Assessment and plan of treatment:	You presented with hypertension, and BP meds were held for permissive hypertension.  Please resume BP regimen, and schedule a close follow up with your PCP.  Secondary Diagnosis:	DM (diabetes mellitus)  Goal:	HbA1c: 8.8  Assessment and plan of treatment:	You diabetes is poorly controlled.  Please continue current insulin regimen and follow up with your PCP as  outpatient within 1 week for follow up.  Secondary Diagnosis:	High cholesterol  Goal:	Please continue statin therapy Principal Discharge DX:	Dizziness  Goal:	Please continue current medication regimen, and schedule a close follow up with your PCP  Assessment and plan of treatment:	You presented with dizziness and were admitted for possible CVA.  CT head was neg for acute process.  MRI neg for stroke.  Carotid doppler significant for rt sided ICA stenosis of 25-30%.  Echo sig for normal ejection fraction w/ EF 55-60%.  Please continue current medication regimen, and schedule a close follow up with your PCP for follow up.  Secondary Diagnosis:	HTN (hypertension)  Goal:	Please continue current antihypertensive regimen  Assessment and plan of treatment:	You presented with hypertension, and BP meds were held for permissive hypertension.  Please resume BP regimen, and schedule a close follow up with your PCP.  Secondary Diagnosis:	DM (diabetes mellitus)  Goal:	HbA1c: 8.8  Assessment and plan of treatment:	You diabetes is poorly controlled.  Please continue current insulin regimen and follow up with your PCP as  outpatient within 1 week for follow up.  Please hold 10 units pre meal for now and resume as indicated.  Please check glucose before meals.  Secondary Diagnosis:	High cholesterol  Goal:	Please continue statin therapy

## 2018-05-14 NOTE — DISCHARGE NOTE ADULT - PATIENT PORTAL LINK FT
You can access the [facility], offered by [facility], by registering with the following website: [email or web address]

## 2018-05-14 NOTE — PROGRESS NOTE ADULT - SUBJECTIVE AND OBJECTIVE BOX
PRESENTING CC:Dizziness    SUBJ:71 y/o M pt with PMHx of DM, HLD, CVA 2 years ago  presents to the ED  c/o dizziness, lightheadedness and face and tongue numbness.In ED pt was found to have elevated troponin 0.256 and EKG was NSR @ 69 with concerns of hyper acute T waves. Pt denies CP, N/V, diarrhea or any other complaints.No overnight events noted. BP remains elevated           PMH -reviewed admission note, no change since admission  Heart failure: acute [ ] chronic [ ] acute or chronic [ ] diastolic [ ] systolic [ ] combined systolic and diastolic[ ]  ANDREW: ATN[ ] renal medullary necrosis [ ] CKD I [ ]CKDII [ ]CKD III [ ]CKD IV [ ]CKD V [ ]Other pathological lesions [ ]    MEDICATIONS  (STANDING):  aspirin enteric coated 81 milliGRAM(s) Oral daily  atorvastatin 40 milliGRAM(s) Oral at bedtime  insulin glargine Injectable (LANTUS) 35 Unit(s) SubCutaneous at bedtime  insulin lispro (HumaLOG) corrective regimen sliding scale   SubCutaneous Before meals and at bedtime      FAMILY HISTORY:  No pertinent family history in first degree relatives  No family history of premature coronary artery disease or sudden cardiac death      REVIEW OF SYSTEMS:  Constitutional: [ ] fever, [ ]weight loss,  [x ]fatigue  Eyes: [ ] visual changes  Respiratory: [ ]shortness of breath;  [ ] cough, [ ]wheezing, [ ]chills, [ ]hemoptysis  Cardiovascular: [ ] chest pain, [ ]palpitations, [x ]dizziness,  [ ]leg swelling[ ]orthopnea[ ]PND  Gastrointestinal: [ ] abdominal pain, [ ]nausea, [ ]vomiting,  [ ]diarrhea   Genitourinary: [ ] dysuria, [ ] hematuria  Neurologic: [ ] headaches [ ] tremors[ ]weakness  Skin: [ ] itching, [ ]burning, [ ] rashes  Endocrine: [ ] heat or cold intolerance  Musculoskeletal: [ ] joint pain or swelling; [ ] muscle, back, or extremity pain  Psychiatric: [ ] depression, [ ]anxiety, [ ]mood swings, or [ ]difficulty sleeping  Hematologic: [ ] easy bruising, [ ] bleeding gums    [x] All remaining systems negative except as per above.   [ ]Unable to obtain.    Vital Signs Last 24 Hrs  T(C): 36.7 (13 May 2018 05:00), Max: 37.1 (12 May 2018 19:29)  T(F): 98 (13 May 2018 05:00), Max: 98.7 (12 May 2018 19:29)  HR: 70 (13 May 2018 05:00) (65 - 78)  BP: 179/78 (13 May 2018 05:00) (171/77 - 196/78)  RR: 18 (13 May 2018 05:00) (18 - 18)  SpO2: 99% (13 May 2018 05:00) (98% - 100%)  I&O's Summary      PHYSICAL EXAM:  General: No acute distress BMI-24  HEENT: EOMI, PERRL  Neck: Supple, [ ] JVD  Lungs: Equal air entry bilaterally; [ ] rales [ ] wheezing [ ] rhonchi  Heart: Regular rate and rhythm; [x ] murmur   2/6 [x] systolic [ ] diastolic [ ] radiation[ ] rubs [ ]  gallops  Abdomen: Nontender, bowel sounds present  Extremities: No clubbing, cyanosis, [ ] edema  Nervous system:  Alert & Oriented X3, no focal deficits  Psychiatric: Normal affect  Skin: No rashes or lesions    LABS:  05-12    136  |  107  |  15  ----------------------------<  194<H>  3.6   |  23  |  1.15    Ca    8.7      12 May 2018 11:07  Phos  2.7     05-12  Mg     2.2     05-12    TPro  8.0  /  Alb  3.4<L>  /  TBili  0.2  /  DBili  x   /  AST  17  /  ALT  16  /  AlkPhos  71  05-12    Creatinine Trend: 1.15<--, 1.22<--                        11.8   4.8   )-----------( 202      ( 12 May 2018 12:21 )             36.8     PT/INR - ( 12 May 2018 02:02 )   PT: 10.6 sec;   INR: 0.97 ratio         PTT - ( 12 May 2018 02:02 )  PTT:27.1 sec  Cardiac Enzymes: CARDIAC MARKERS ( 12 May 2018 11:07 )  0.245 ng/mL / x     / 129 U/L / x     / 2.5 ng/mL  CARDIAC MARKERS ( 12 May 2018 04:44 )  0.251 ng/mL / x     / x     / x     / x      CARDIAC MARKERS ( 12 May 2018 02:02 )  0.256 ng/mL / x     / x     / x     / x          RADIOLOGY: US DPLX CAROTIDS COMPL BI    IMPRESSION:  Moderate to significant mixed plaque at bilateral carotid bulbs extending  into the bilateral proximal internal and external carotid arteries.   Elevated velocities on the left suggesting 50-69% stenosis.   Although  velocities not measured elevated on the right given the amount of plaque and narrowing of the color flow there is concern for stenosis.      CT BRAIN     IMPRESSION: No evidence of acute intracranial hemorrhage, midline shift or CT  evidence of acute territorial infarct.      ECG [my interpretation]:Sinus rhythm at 69bpm,non specific T wave abnormalities.    TELEMETRY:Sinus rhythm no arrhythmias      IMPRESSION AND PLAN:  71 y/o M pt with PMHx of DM, HLD, CVA 2 years ago  presents to the ED  c/o dizziness, lightheadedness and face and tongue numbness.       Problem/Plan - 1:  ·  Problem: Elevated troponin.  Plan: was found to have troponin 0.256 with no chest pain   EKG: NSR @ 69 with questionable hyper acute T waves-  Troponins non diagnostic for cardiac injury-likely leak 2/2 Accelerated HTN.  TTE-LV Function      Problem/Plan - 2:  ·  Problem: Dizziness.  Plan: accompanied with tongue and face  numbness   CT head negative   symptoms resolved while in ED  orthostatic negative.  -Awaiting MRI.Carotid disease-r/o Right critical [id number]     Problem/Plan - 3:  ·  Problem: HTN (hypertension).  Plan: pt is on acetazolamide, amlodipine, HCTZ,  and labetalol   restart BP meds to keep SBP~~150      Problem/Plan - 4:  ·  Problem: DM (diabetes mellitus).  Plan: on Lantus 40 and Humalog 10 ass needed  started on Lantus 35
PRESENTING CC:Dizziness.    SUBJ: 73 y/o M pt with PMHx of DM, HLD, CVA 2 years ago  presents to the ED  c/o dizziness, lightheadedness and face and tongue numbness.In ED pt was found to have elevated troponin 0.256 and EKG was NSR @ 69 with concerns of hyper acute T waves. Pt denies CP, N/V, diarrhea or any other complaints.No overnight events noted.       PMH -reviewed admission note, no change since admission  Heart failure: acute [ ] chronic [ ] acute or chronic [ ] diastolic [ ] systolic [ ] combined systolic and diastolic[ ]  ANDREW: ATN[ ] renal medullary necrosis [ ] CKD I [ ]CKDII [ ]CKD III [ ]CKD IV [ ]CKD V [ ]Other pathological lesions [ ]    MEDICATIONS  (STANDING):  artificial  tears Solution 1 Drop(s) Both EYES daily  aspirin enteric coated 81 milliGRAM(s) Oral daily  atorvastatin 40 milliGRAM(s) Oral at bedtime  insulin glargine Injectable (LANTUS) 35 Unit(s) SubCutaneous at bedtime  insulin lispro (HumaLOG) corrective regimen sliding scale   SubCutaneous Before meals and at bedtime  labetalol 100 milliGRAM(s) Oral two times a day        FAMILY HISTORY:  No pertinent family history in first degree relatives  No family history of premature coronary artery disease or sudden cardiac death      REVIEW OF SYSTEMS:  Constitutional: [ ] fever, [ ]weight loss,  [x ]fatigue  Eyes: [ ] visual changes  Respiratory: [ ]shortness of breath;  [ ] cough, [ ]wheezing, [ ]chills, [ ]hemoptysis  Cardiovascular: [ ] chest pain, [ ]palpitations, [x ]dizziness,  [ ]leg swelling[ ]orthopnea[ ]PND  Gastrointestinal: [ ] abdominal pain, [ ]nausea, [ ]vomiting,  [ ]diarrhea   Genitourinary: [ ] dysuria, [ ] hematuria  Neurologic: [ ] headaches [ ] tremors[ ]weakness  Skin: [ ] itching, [ ]burning, [ ] rashes  Endocrine: [ ] heat or cold intolerance  Musculoskeletal: [ ] joint pain or swelling; [ ] muscle, back, or extremity pain  Psychiatric: [ ] depression, [ ]anxiety, [ ]mood swings, or [ ]difficulty sleeping  Hematologic: [ ] easy bruising, [ ] bleeding gums    [x] All remaining systems negative except as per above.   [ ]Unable to obtain.    Vital Signs Last 24 Hrs  T(C): 36.6 (14 May 2018 08:40), Max: 37 (13 May 2018 15:46)  T(F): 97.9 (14 May 2018 08:40), Max: 98.6 (13 May 2018 15:46)  HR: 57 (14 May 2018 08:40) (57 - 68)  BP: 146/65 (14 May 2018 08:40) (146/65 - 187/87)  RR: 18 (14 May 2018 08:40) (18 - 18)  SpO2: 98% (14 May 2018 08:40) (97% - 100%)      PHYSICAL EXAM:  General: No acute distress BMI-24  HEENT: EOMI, PERRL  Neck: Supple, [ ] JVD  Lungs: Equal air entry bilaterally; [ ] rales [ ] wheezing [ ] rhonchi  Heart: Regular rate and rhythm; [x ] murmur  2/6 [x ] systolic [ ] diastolic [ ] radiation[ ] rubs [ ]  gallops  Abdomen: Nontender, bowel sounds present  Extremities: No clubbing, cyanosis, [ ] edema  Nervous system:  Alert & Oriented X3, no focal deficits  Psychiatric: Normal affect  Skin: No rashes or lesions    LABS:  05-14    141  |  107  |  27<H>  ----------------------------<  109<H>  3.2<L>   |  25  |  1.20    Ca    8.8      14 May 2018 07:12  Phos  2.7     05-12  Mg     2.2     05-12      Creatinine Trend: 1.20<--, 1.15<--, 1.22<--                        11.9   5.3   )-----------( 212      ( 14 May 2018 07:12 )             36.3       Lipid Panel:   Cardiac Enzymes: CARDIAC MARKERS ( 12 May 2018 11:07 )  0.245 ng/mL / x     / 129 U/L / x     / 2.5 ng/mL      TELEMETRY:Sinus Rhythm no arrhythmias    IMPRESSION AND PLAN:    73 y/o M pt with PMHx of T2DM, HLD, CVA 2 years ago  presents to the ED  c/o dizziness, lightheadedness and face and tongue numbness.       Problem/Plan - 1:  ·  Problem: Elevated troponin.  Plan: was found to have troponin 0.256 with no chest pain   EKG: NSR @ 69 with questionable hyper acute T waves-  Troponins non diagnostic for cardiac injury-likely leak 2/2 Accelerated HTN.  TTE-LV Function      Problem/Plan - 2:  ·  Problem: Dizziness.  Plan: accompanied with tongue and face  numbness   CT head negative   symptoms resolved while in ED  orthostatic negative.  -Awaiting MRI.Carotid disease-r/o Right critical [id number]     Problem/Plan - 3:  ·  Problem: HTN (hypertension).  Plan: pt is on acetazolamide, amlodipine, HCTZ,  and labetalol   restart BP meds to keep SBP~~150Problem/Plan - 4:  ·  Problem: DM (diabetes mellitus).  Plan: on Lantus 40 and Humalog 10 ass needed  started on Lantus 35
REINALDO REBOLLEDO  [id number]    Patient is a 72y old  Male who presents with a chief complaint of dizziness (12 May 2018 05:32)      patient seen and examined    s no cp ,sob    MEDICATIONS  (STANDING):  aspirin enteric coated 81 milliGRAM(s) Oral daily  atorvastatin 40 milliGRAM(s) Oral at bedtime  insulin glargine Injectable (LANTUS) 35 Unit(s) SubCutaneous at bedtime  insulin lispro (HumaLOG) corrective regimen sliding scale   SubCutaneous Before meals and at bedtime  labetalol 100 milliGRAM(s) Oral two times a day      MEDICATIONS  (PRN):      Allergies    No Known Allergies    Intolerances        PAST MEDICAL & SURGICAL HISTORY:  High cholesterol  DM (diabetes mellitus)  No significant past surgical history      FAMILY HISTORY:  No pertinent family history in first degree relatives      SOCIAL HISTORY  Smoking History:     REVIEW OF SYSTEMS:    CONSTITUTIONAL:  Fevers [  ]  Yes  [x  ]  No                                   chills [  ]  Yes  [x  ]  No                               sweats  [  ]  Yes  [ x ]  No                                fatigue [  ]  Yes  [x  ]  No    HEENT:  Eyes:  Diplopia or blurred vision [  ]  Yes  [x  ]  No    ENT:                        earache  [  ]  Yes  [x  ]  No                             sore throat  [  ]  Yes  [ x ]  No                            runny nose.  [  ]  Yes  [  x]  No    CARDIOVASCULAR:       chest pain  [  ]  Yes  [x  ]  No                        squeezing, tightness,  [  ]  Yes  [x  ]  No                                      palpitations.  [  ]  Yes  [ x ]  No    RESPIRATORY:             Cough [  ]  Yes  [x  ]  No                                  Wheezing [  ]  Yes  [  x]  No                                Hemoptysis [  ]  Yes  [ x ]  No                                      Sputum [  ]  Yes  [x  ]  No                   Shortness of Breathe [  ]  Yes  [  x]  No    GASTROINTESTINAL:      Abdominal pain  [  ]  Yes  [ x ]  No                                                    Nausea  [  ]  Yes  [  x]  No                                                   Vomiting [  ]  Yes  [ x ]  No                                              Constipation [  ]  Yes  [ x ]  No                                                    Diarrhea [  ]  Yes  [ x ]  No    GENITOURINARY:           Incontinence [  ]  Yes  [ xx ]  No                                                Dysuria [  ]  Yes  [ x ]  No                                      Blood in Urine  [  ]  Yes  [x  ]  No                          Frequency or urgency.  [  ]  Yes  [  x]  No    NEUROLOGIC:                     Paresthesias  [  ]  Yes  [ x ]  No                                             fasciculations  [  ]  Yes  [x  ]  No                                seizures or weakness.  [  ]  Yes  [x  ]  No                                                   Headache [  ]  Yes  [ xx ]  No                                  Loss of Conciousness [  ]  Yes  [ x ]  No                                                     Insomnia [  ]  Yes  [  x]  No    PSYCHIATRIC:    Disorder of thought or mood.  [  ]  Yes  [ x ]  No                                                           Anxiety [  ]  Yes  [  ]  No                                                      Depression [  ]  Yes  [  ]  No                                                         Dementia [  ]  Yes  [  ]  No    Vital Signs Last 24 Hrs  T(C): 37 (13 May 2018 15:46), Max: 37.1 (12 May 2018 19:29)  T(F): 98.6 (13 May 2018 15:46), Max: 98.7 (12 May 2018 19:29)  HR: 65 (13 May 2018 15:46) (62 - 77)  BP: 187/87 (13 May 2018 15:46) (162/69 - 191/57)  BP(mean): --  RR: 18 (13 May 2018 15:46) (18 - 18)  SpO2: 100% (13 May 2018 15:46) (98% - 100%)  I&O's Detail      PHYSICAL EXAMINATION:    GENERAL: The patient is a well-developed, well-nourished _____in no apparent distress.     HEENT: Head is normocephalic and atraumatic. Extraocular muscles are intact. Mucous membranes are moist.     NECK: Supple. jvd [  ]  Yes  [ x ]  No    LUNGS: Clear to auscultation  [  ]  Yes  [ x ]  No                               wheezing, [  ]  Yes  [x  ]  No                                      rales  [  ]  Yes  [ x ]  No                                    rhonchi  [  ]  Yes  [ x ]  No                 Respirations labored  [  ]  Yes  [xx  ]  No    HEART: Regular rate and rhythm  [  ]x  Yes  [  ]  No                                        Murmur [  ]  Yes  [x  ]  No                                              rub  [  ]  Yes  [ x ]  No                                          gallop  [  ]  Yes  [ x ]  No    ABDOMEN:                                 Soft, [ x]  Yes  [  ]  No                                             nontender [ x ]  Yes  [  ]  No                                             distended.[  ]  Yes  [x  ]  No                            hepatosplenomegaly ,[  ]  Yes  [x  ]  No                                                    BS   [ x ]  Yes  [  ]  No    EXTREMITIES:                cyanosis, [  ]  Yes  [  x]  No                                       clubbing,   [  ]  Yes  [ x ]  No                                               rash, [  ]  Yes  [ x ]  No                                           edema.  [  ]  Yes  [x  ]  No    NEUROLOGIC: Alert and Oriented  [ x ] Person [x  ] Time  [ ] Place                                    Cranial nerves intact    [ x ]  Yes  [  ]  No                                               sensory Intact  [ x ]  Yes  [  ]  No                                                  motor WNL   [ x ]  Yes  [  ]  No                                                Yordy Paresis  [  ]  Yes  [ x ]  No  DTR  babinski+ or -      LABS:                        11.8   4.8   )-----------( 202      ( 12 May 2018 12:21 )             36.8     05-12    136  |  107  |  15  ----------------------------<  194<H>  3.6   |  23  |  1.15    Ca    8.7      12 May 2018 11:07  Phos  2.7     05-12  Mg     2.2     05-12    TPro  8.0  /  Alb  3.4<L>  /  TBili  0.2  /  DBili  x   /  AST  17  /  ALT  16  /  AlkPhos  71  05-12    PT/INR - ( 12 May 2018 02:02 )   PT: 10.6 sec;   INR: 0.97 ratio         PTT - ( 12 May 2018 02:02 )  PTT:27.1 sec      CARDIAC MARKERS ( 12 May 2018 11:07 )  0.245 ng/mL / x     / 129 U/L / x     / 2.5 ng/mL  CARDIAC MARKERS ( 12 May 2018 04:44 )  0.251 ng/mL / x     / x     / x     / x      CARDIAC MARKERS ( 12 May 2018 02:02 )  0.256 ng/mL / x     / x     / x     / x                    MICROBIOLOGY:    RADIOLOGY & ADDITIONAL STUDIES:    CXR:    Ct scan chest:    ekg;    echo:
REINALDO REBOLLEDO  [id number]    Patient is a 72y old  Male who presents with a chief complaint of dizziness (12 May 2018 05:32)      patient seen and examined    s no cp ,sob    .MEDICATIONS  (STANDING):  artificial  tears Solution 1 Drop(s) Both EYES daily  aspirin enteric coated 81 milliGRAM(s) Oral daily  atorvastatin 40 milliGRAM(s) Oral at bedtime  insulin glargine Injectable (LANTUS) 35 Unit(s) SubCutaneous at bedtime  insulin lispro (HumaLOG) corrective regimen sliding scale   SubCutaneous Before meals and at bedtime  labetalol 100 milliGRAM(s) Oral two times a day    MEDICATIONS  (PRN):      Allergies    No Known Allergies    Intolerances        PAST MEDICAL & SURGICAL HISTORY:  High cholesterol  DM (diabetes mellitus)  No significant past surgical history      FAMILY HISTORY:  No pertinent family history in first degree relatives      SOCIAL HISTORY  Smoking History:     REVIEW OF SYSTEMS:    CONSTITUTIONAL:  Fevers [  ]  Yes  [x  ]  No                                   chills [  ]  Yes  [x  ]  No                               sweats  [  ]  Yes  [ x ]  No                                fatigue [  ]  Yes  [x  ]  No    HEENT:  Eyes:  Diplopia or blurred vision [  ]  Yes  [x  ]  No    ENT:                        earache  [  ]  Yes  [x  ]  No                             sore throat  [  ]  Yes  [ x ]  No                            runny nose.  [  ]  Yes  [  x]  No    CARDIOVASCULAR:       chest pain  [  ]  Yes  [x  ]  No                        squeezing, tightness,  [  ]  Yes  [x  ]  No                                      palpitations.  [  ]  Yes  [ x ]  No    RESPIRATORY:             Cough [  ]  Yes  [x  ]  No                                  Wheezing [  ]  Yes  [  x]  No                                Hemoptysis [  ]  Yes  [ x ]  No                                      Sputum [  ]  Yes  [x  ]  No                   Shortness of Breathe [  ]  Yes  [  x]  No    GASTROINTESTINAL:      Abdominal pain  [  ]  Yes  [ x ]  No                                                    Nausea  [  ]  Yes  [  x]  No                                                   Vomiting [  ]  Yes  [ x ]  No                                              Constipation [  ]  Yes  [ x ]  No                                                    Diarrhea [  ]  Yes  [ x ]  No    GENITOURINARY:           Incontinence [  ]  Yes  [ xx ]  No                                                Dysuria [  ]  Yes  [ x ]  No                                      Blood in Urine  [  ]  Yes  [x  ]  No                          Frequency or urgency.  [  ]  Yes  [  x]  No    NEUROLOGIC:                     Paresthesias  [  ]  Yes  [ x ]  No                                             fasciculations  [  ]  Yes  [x  ]  No                                seizures or weakness.  [  ]  Yes  [x  ]  No                                                   Headache [  ]  Yes  [ xx ]  No                                  Loss of Conciousness [  ]  Yes  [ x ]  No                                                     Insomnia [  ]  Yes  [  x]  No    PSYCHIATRIC:    Disorder of thought or mood.  [  ]  Yes  [ x ]  No                                                           Anxiety [  ]  Yes  [  ]  No                                                      Depression [  ]  Yes  [  ]  No                                                         Dementia [  ]  Yes  [  ]  No  .Vital Signs Last 24 Hrs  T(C): 36.6 (14 May 2018 08:40), Max: 37 (13 May 2018 15:46)  T(F): 97.9 (14 May 2018 08:40), Max: 98.6 (13 May 2018 15:46)  HR: 57 (14 May 2018 08:40) (57 - 68)  BP: 146/65 (14 May 2018 08:40) (146/65 - 187/87)  BP(mean): --  RR: 18 (14 May 2018 08:40) (18 - 18)  SpO2: 98% (14 May 2018 08:40) (97% - 100%)  PHYSICAL EXAMINATION:    GENERAL: The patient is a well-developed, well-nourished _____in no apparent distress.     HEENT: Head is normocephalic and atraumatic. Extraocular muscles are intact. Mucous membranes are moist.     NECK: Supple. jvd [  ]  Yes  [ x ]  No    LUNGS: Clear to auscultation  [  ]  Yes  [ x ]  No                               wheezing, [  ]  Yes  [x  ]  No                                      rales  [  ]  Yes  [ x ]  No                                    rhonchi  [  ]  Yes  [ x ]  No                 Respirations labored  [  ]  Yes  [xx  ]  No    HEART: Regular rate and rhythm  [  ]x  Yes  [  ]  No                                        Murmur [  ]  Yes  [x  ]  No                                              rub  [  ]  Yes  [ x ]  No                                          gallop  [  ]  Yes  [ x ]  No    ABDOMEN:                                 Soft, [ x]  Yes  [  ]  No                                             nontender [ x ]  Yes  [  ]  No                                             distended.[  ]  Yes  [x  ]  No                            hepatosplenomegaly ,[  ]  Yes  [x  ]  No                                                    BS   [ x ]  Yes  [  ]  No    EXTREMITIES:                cyanosis, [  ]  Yes  [  x]  No                                       clubbing,   [  ]  Yes  [ x ]  No                                               rash, [  ]  Yes  [ x ]  No                                           edema.  [  ]  Yes  [x  ]  No    NEUROLOGIC: Alert and Oriented  [ x ] Person [x  ] Time  [ ] Place                                    Cranial nerves intact    [ x ]  Yes  [  ]  No                                               sensory Intact  [ x ]  Yes  [  ]  No                                                  motor WNL   [ x ]  Yes  [  ]  No                                                Yordy Paresis  [  ]  Yes  [ x ]  No  DTR  babinski+ or -  LABS:                        11.9   5.3   )-----------( 212      ( 14 May 2018 07:12 )             36.3     05-14    141  |  107  |  27<H>  ----------------------------<  109<H>  3.2<L>   |  25  |  1.20    Ca    8.8      14 May 2018 07:12    < from: US Duplex Carotid Arteries Complete, Bilateral (05.12.18 @ 10:08) >  IMPRESSION:      Moderate to significant mixed plaque at bilateral carotid bulbs extending   into the bilateral proximal internal and external carotid arteries.   Elevated velocities on the left suggesting 50-69% stenosis. Although   velocities not measured elevated on the right given the amount of plaque   and narrowing of the color flow there is concern for stenosis. Recommend   CTA or MRA of the carotid arteries for further evaluation.      < end of copied text >                            LABS:                        11.8   4.8   )-----------( 202      ( 12 May 2018 12:21 )             36.8     05-12    136  |  107  |  15  ----------------------------<  194<H>  3.6   |  23  |  1.15    Ca    8.7      12 May 2018 11:07  Phos  2.7     05-12  Mg     2.2     05-12    TPro  8.0  /  Alb  3.4<L>  /  TBili  0.2  /  DBili  x   /  AST  17  /  ALT  16  /  AlkPhos  71  05-12    PT/INR - ( 12 May 2018 02:02 )   PT: 10.6 sec;   INR: 0.97 ratio         PTT - ( 12 May 2018 02:02 )  PTT:27.1 sec      CARDIAC MARKERS ( 12 May 2018 11:07 )  0.245 ng/mL / x     / 129 U/L / x     / 2.5 ng/mL  CARDIAC MARKERS ( 12 May 2018 04:44 )  0.251 ng/mL / x     / x     / x     / x      CARDIAC MARKERS ( 12 May 2018 02:02 )  0.256 ng/mL / x     / x     / x     / x                    MICROBIOLOGY:    RADIOLOGY & ADDITIONAL STUDIES:    CXR:    Ct scan chest:    ekg;    echo:

## 2020-01-01 ENCOUNTER — RESULT REVIEW (OUTPATIENT)
Age: 75
End: 2020-01-01

## 2020-01-01 ENCOUNTER — APPOINTMENT (OUTPATIENT)
Dept: VASCULAR SURGERY | Facility: HOSPITAL | Age: 75
End: 2020-01-01
Payer: MEDICARE

## 2020-01-01 DIAGNOSIS — L97.922 NON-PRESSURE CHRONIC ULCER OF UNSPECIFIED PART OF LEFT LOWER LEG WITH FAT LAYER EXPOSED: ICD-10-CM

## 2020-01-01 PROCEDURE — 36245 INS CATH ABD/L-EXT ART 1ST: CPT | Mod: LT

## 2020-01-01 PROCEDURE — 11042 DBRDMT SUBQ TIS 1ST 20SQCM/<: CPT

## 2020-01-01 RX ORDER — OXYCODONE AND ACETAMINOPHEN 5; 325 MG/1; MG/1
5-325 TABLET ORAL EVERY 4 HOURS
Qty: 30 | Refills: 0 | Status: ACTIVE | COMMUNITY
Start: 2020-01-01 | End: 1900-01-01

## 2020-07-02 PROBLEM — Z00.00 ENCOUNTER FOR PREVENTIVE HEALTH EXAMINATION: Status: ACTIVE | Noted: 2020-01-01

## 2020-07-08 PROBLEM — L97.922 ULCER OF LEFT LOWER EXTREMITY WITH FAT LAYER EXPOSED: Status: ACTIVE | Noted: 2020-01-01

## 2021-05-13 NOTE — DISCHARGE NOTE ADULT - RECOGNIZE DANGER SITUATIONS. FOR EXAMPLE, STRESS, DRINKING ALCOHOL, URGES TO SMOKE, SMOKING CUES, CIGARETTE AVAILABILITY
PCP: Nikko Squires MD    Last appt: 12/16/2020  Future Appointments   Date Time Provider Weston Hebert   6/8/2021 11:00 AM [address]   6/21/2021  9:10 AM Nikko Squires MD [facility]       Last refilled:9/9/20    Requested Prescriptions     Pending Prescriptions Disp Refills    ALPRAZolam (XANAX) 0.5 mg tablet [Pharmacy Med Name: ALPRAZOLAM 0.5MG TABLETS] 30 Tab 1     Sig: TAKE 1 TABLET BY MOUTH THREE TIMES DAILY AS NEEDED FOR ANXIETY.  MAX DAILY AMOUNT: 1.5 MG
Statement Selected
